# Patient Record
Sex: MALE | Race: WHITE
[De-identification: names, ages, dates, MRNs, and addresses within clinical notes are randomized per-mention and may not be internally consistent; named-entity substitution may affect disease eponyms.]

---

## 2018-11-06 ENCOUNTER — APPOINTMENT (OUTPATIENT)
Dept: FAMILY MEDICINE | Facility: CLINIC | Age: 23
End: 2018-11-06
Payer: MEDICAID

## 2018-11-06 VITALS
HEIGHT: 68 IN | BODY MASS INDEX: 19.76 KG/M2 | SYSTOLIC BLOOD PRESSURE: 118 MMHG | DIASTOLIC BLOOD PRESSURE: 62 MMHG | OXYGEN SATURATION: 98 % | RESPIRATION RATE: 16 BRPM | WEIGHT: 130.38 LBS | HEART RATE: 64 BPM

## 2018-11-06 DIAGNOSIS — F32.9 MAJOR DEPRESSIVE DISORDER, SINGLE EPISODE, UNSPECIFIED: ICD-10-CM

## 2018-11-06 DIAGNOSIS — Z00.00 ENCOUNTER FOR GENERAL ADULT MEDICAL EXAMINATION W/OUT ABNORMAL FINDINGS: ICD-10-CM

## 2018-11-06 PROCEDURE — 99385 PREV VISIT NEW AGE 18-39: CPT

## 2018-11-06 RX ORDER — ESCITALOPRAM OXALATE 10 MG/1
10 TABLET ORAL DAILY
Qty: 90 | Refills: 0 | Status: ACTIVE | COMMUNITY
Start: 2018-11-06 | End: 1900-01-01

## 2018-11-06 NOTE — ASSESSMENT
[FreeTextEntry1] : depression - engaged in lengthy discussion of risks and benefits of SSRI such as lexapro. Start lexapro 10mg q day. f/u in 2-3 months. Pt also knows to go to ER if SI worsens to the point he starts developing a plan that he would like to follow through with.\par \par Pt reports having done screening labs less than a year ago, declines labs today.\par Healthy diet and regular exercise regimen discussed w/ pt.\par Any questions call office

## 2018-11-06 NOTE — HISTORY OF PRESENT ILLNESS
[de-identified] : New patient to reestablish care. Pt in office for CPE. Pt has been feeling depressed in past 7 yrs. Depression worsening in past 5-7 months after breaking up with girlfriend. PHQ19 today. Pt has occasional SI but has never had plan to follow through with hurting himself. Pt still functioning well at work but has significant fatigue. Pt has recently started seeing therapist once a week. Denies CP, palpitations, dyspnea, n/v. \par Nonsmoke\par ETOH use social \par Drug use rare marijuana\par Exercises irregularly\par Diet balanced\par Works at [employer] [address] \par declines flu or tdap vaccines

## 2020-05-21 ENCOUNTER — APPOINTMENT (OUTPATIENT)
Dept: FAMILY MEDICINE | Facility: CLINIC | Age: 25
End: 2020-05-21
Payer: COMMERCIAL

## 2020-05-21 VITALS
TEMPERATURE: 98.3 F | DIASTOLIC BLOOD PRESSURE: 70 MMHG | RESPIRATION RATE: 16 BRPM | OXYGEN SATURATION: 98 % | WEIGHT: 135 LBS | SYSTOLIC BLOOD PRESSURE: 104 MMHG | HEART RATE: 88 BPM | BODY MASS INDEX: 20.46 KG/M2 | HEIGHT: 68 IN

## 2020-05-21 DIAGNOSIS — Z23 ENCOUNTER FOR IMMUNIZATION: ICD-10-CM

## 2020-05-21 PROCEDURE — 90471 IMMUNIZATION ADMIN: CPT

## 2020-05-21 PROCEDURE — 90716 VAR VACCINE LIVE SUBQ: CPT

## 2020-05-21 NOTE — ASSESSMENT
[FreeTextEntry1] : Varivax administered, pt consent given before administration and after discussion of risks/benefits, pt tolerated well. 2nd vaccine in series to be done after 4 weeks.

## 2020-05-21 NOTE — HISTORY OF PRESENT ILLNESS
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 Varicella titer was low. Pt requesting varivax today.